# Patient Record
Sex: FEMALE | Race: WHITE
[De-identification: names, ages, dates, MRNs, and addresses within clinical notes are randomized per-mention and may not be internally consistent; named-entity substitution may affect disease eponyms.]

---

## 2018-06-07 NOTE — XMS REPORT
Clinical Summary

 Created on: 2018



Indy Pedraza

External Reference #: [id number]

: 1960

Sex: Female



Demographics







 Address  [address] [place]  [address]

 

 Home Phone  [phone]

 

 Preferred Language  English

 

 Marital Status  

 

 Oriental orthodox Affiliation  [id number]

 

 Race  Unknown

 

 Ethnic Group  Non-





Author







 Author  [facility]

 

 Organization  [facility]

 

 Address  Unknown

 

 Phone  Unavailable







Support







 Name  Relationship  Address  Phone

 

 John Pedraza  ECON  Unknown  [phone]







Care Team Providers







 Care Team Member Name  Role  Phone

 

 Lee Brito MD  PCP  [phone]







Allergies







    



  Active Allergy   Reactions   Severity   Noted Date   Comments

 

    



  Sumatriptan Succinate   Anaphylaxis   High   2016 

 

    



  Penicillins   Other (See Comments)    2016   PT HAD REACTION AS A



      CHILD

 

    



  Sulfa (Sulfonamide   Hives    2016 



  Antibiotics)    







Current Medications







      



  Prescription   Sig.   Disp.   Refills   Start   End Date   Status



      Date  

 

      



  diphenoxylate-atropine   4 (four) times a day as     20    Active



  (LOMOTIL) 2.5-0.025 mg   needed.     16  



  per tablet      

 

      



  estradiol (ESTRACE) 0.5   TAKE ONE (1) TABLET(S) BY    2   20    Active



  MG tablet   MOUTH ONCE A DAY.     15  

 

      



  hydromorPHONE (DILAUDID)   Take 4 mg by mouth 2     20    Active



  8 MG tablet   (two) times a day.     16  

 

      



  ibuprofen (ADVIL,MOTRIN)   TAKE ONE (1) TABLET(S) BY    1   20    Active



  800 MG tablet   MOUTH TWO TIMES A DAY     15  



   PRN.     

 

      



  promethazine (PHENERGAN)   TAKE ONE (1) TABLET(S) BY    1   20    Active



  25 MG tablet   MOUTH EVERY SIX HOURS AS     16  



   NEEDED.     

 

      



  ZYLET 0.3-0.5 %   INSTILL ONE (1) DROP IN    1   20    Active



  drops,suspension   BOTH EYES 3 TIMES A DAY.     16  

 

      



  tolterodine LA (DETROL   Take 4 mg by mouth daily     20    Active



  LA) 4 MG 24 hr capsule   as needed.     16  

 

      



  tretinoin (RETIN-A) 0.1 %   APPLY PEA SIZE AMOUNT TO    1   20    Active



  cream   ENTIRE FACE AT BEDTIME.     15  

 

      



  diazepam (VALIUM) 10 MG   Take 10 mg by mouth 2       Active



  tablet   (two) times a day as     



   needed for anxiety.     

 

      



  NP THYROID 30 mg   TAKE ONE (1) TABLET(S) BY   90 tablet   0   20    
Active



  tabletIndications:   MOUTH DAILY.     16  



  Hypothyroidism,      



  unspecified      



  hypothyroidism type      

 

      



  VITAMIN D2 50,000 unit   TAKE ONE (1) CAPSULE(S)   6 capsule   0   20  
  Active



  capsuleIndications:   BY MOUTH EVERY 14 DAYS.     16  



  Osteoporosis      

 

      



  NUCYNTA tablet   Take 1 tablet by mouth 4     20    Active



   (four) times a day.     16  

 

      



  gabapentin (NEURONTIN)   TAKE 1 TABLET BY MOUTH    0   20    Active



  800 mg tablet   EVERY EIGHT HOURS     17  

 

      



  metFORMIN (GLUMETZA) 500   TAKE ONE (1) TABLET(S) BY    3   20    Active



  mg 24 hr tablet   MOUTH TWICE A DAY.     17  

 

      



  alendronate (FOSAMAX) 70   1 tab po q week in the   12 tablet   3   20 
   Active



  MG tabletIndications:   morning w/full glass of     17  



  Age-related osteoporosis   water on an empty     



  without current   stomach, do not lie down     



  pathological fracture   for 30 min     

 

      



  FLUoxetine (PROzac) 20 MG   Take 1 capsule (20 mg   90 capsule   3   20
    Active



  capsule   total) by mouth daily.     17  

 

      



  metaxalone (SKELAXIN) 800   Take 1 tablet (800 mg   180 tablet   3   20
    Active



  MG tabletIndications:   total) by mouth 2 (two)     17  



  Lumbar radiculopathy   times a day as needed for     



   muscle spasms.     

 

      



  estradiol (ESTRACE) 0.01   Insert 1 gram vaginally   42.5 g   1   20    
Active



  % (0.1 mg/gram) vaginal   every night for 2 weeks,     18  



  creamIndications: Vaginal   then after insert 1 gram     



  atrophy   3 times per week at     



   night.     

 

      



  hydromorPHONE (DILAUDID)   TAKE ONE (1) TABLET(S) BY    0   20    Active



  4 MG tablet   MOUTH EVERY TWELVE HOURS     17  



   AS NEEDED FOR PAIN.     

 

      



  ESTRADIOL VAGL   INSERT 1 GRAM VAGINALLY    1   20    Active



   EVERY NIGHT FOR 2 WEEKS,     18  



   THEN AFTER INSERT 1 GRAM     



   3 TIMES PER WEEK AT     



   NIGHT.     

 

      



  DYMISTA 137-50 mcg/spray   USE ONE (1) SPRAY IN EACH    3   01/13/20    Active



  spray,non-aerosol   NOSTRIL DAILY AS NEEDED.     18  

 

      



  fluticasone (FLONASE) 50   INHALE TWO (2) SPRAYS    3   20    Active



  mcg/actuation nasal spray   INTO EACH NOSTRIL ONCE     18  



   DAILY.     

 

      



  FLUoxetine (PROzac) 10 MG   TAKE ONE (1) TO TWO (2)    0   20    Active



  capsule   CAPSULE(S) BY MOUTH EVERY     17  



   MORNING.     

 

      



  albuterol (PROAIR   Inhale 2 puffs every 6   18 g   0   20   
Active



  HFA,PROVENTIL   (six) hours as needed for     18   19 



  HFA,VENTOLIN HFA) 90   shortness of breath (or     



  mcg/actuation   cough).     



  inhalerIndications:      



  Bronchitis      

 

      



  clonIDINE (CATAPRES) 0.1   TAKE ONE (1) TABLET(S) BY   90 tablet   1       Active



  MG tablet   MOUTH THREE TIMES A DAY.     18  

 

      



  RESTASIS 0.05 %   INSTILL ONE (1) DROP IN    3   20   
Discontin



  ophthalmic emulsion   BOTH EYES TWICE A DAY.     16   18   ued

 

      



  FORTEO 20 mcg/dose - 600      20   Discontin



  mcg/2.4 mL injection      16   18   ued

 

      



  baclofen (LIORESAL) 10 MG      20   Discontin



  tablet      16   18   ued

 

      



  DULoxetine (CYMBALTA) 30      20   Discontin



  MG capsule      16   18   ued

 

      



  VITAMIN D2 50,000 unit   TAKE ONE (1) CAPSULE(S)   6 capsule   0   01/10/20   
01/19/20   Discontin



  capsuleIndications:   BY MOUTH EVERY 14 DAYS.     17   18   ued



  Osteoporosis      

 

      



  montelukast (SINGULAIR)   TAKE ONE (1) TABLET(S) BY   30 tablet   5   04/15/
20   01/19/20   Discontin



  10 mg tabletIndications:   MOUTH NIGHTLY.     17   18   ued



  Chronic sinusitis,      



  unspecified location      

 

      



  doxycycline (VIBRAMYCIN)      10/28/20   12/05/20   Discontin



  100 MG capsule      17   17   ued

 

      



  clonIDINE (CATAPRES) 0.1   TAKE ONE (1) TABLET(S) BY    3   20   Discontin



  MG tablet   MOUTH THREE TIMES A DAY.     17   18   ued

 

      



  alendronate (FOSAMAX) 70   TAKE ONE (1) TABLET(S) BY    12   20   Discontin



  MG tablet   MOUTH ONCE A WEEK.     17   17   ued

 

      



  FLUoxetine (PROzac) 20 MG   TAKE ONE (1) CAPSULE(S)    1   20   Discontin



  capsule   BY MOUTH EVERY DAY.     17   17   ued

 

      



  metaxalone (SKELAXIN) 800   TAKE ONE (1) TABLET(S) BY    3   10/10/20   12/05/
20   Discontin



  MG tablet   MOUTH THREE TIMES A DAY.     17   17   ued

 

      



  triamcinolone (KENALOG)   APPLY TO LESIONS 4 TIMES    3   10/27/20   01/19/20
   Discontin



  0.1 % paste   A DAY     17   18   ued

 

      



  doxycycline (VIBRAMYCIN)   Take 1 capsule (100 mg   90 capsule   3   20   



  100 MG   total) by mouth daily for     17   18 



  capsuleIndications: Other   121 days.     



  chronic osteomyelitis,      



  other site      

 

      



  diazePAM (VALIUM) 5 MG   Take 2 tablets (10 mg   30 tablet   0   20   



  tabletIndications:   total) by mouth every 6     18   18 



  Levator spasm   (six) hours as needed for     



   anxiety for up to 30     



   days.     

 

      



  azithromycin (ZITHROMAX)   Take 2 tablets the first   6 tablet   0   20   



  250 MG tabletIndications:   day, then 1 tablet daily     18   18 



  Bronchitis   for 4 days.     

 

      



  benzonatate (TESSALON   Take 2 capsules (200 mg   30 capsule   1   20   



  PERLES) 100 MG   total) by mouth 3 (three)     18   18 



  capsuleIndications:   times a day as needed for     



  Bronchitis   cough for up to 30 days.     

 

      



  methylPREDNISolone   follow package directions   21 tablet   0   20   



  (MEDROL, LONNY,) 4 mg      18   18 



  tabletIndications:      



  Bronchitis      

 

      



  albuterol (PROAIR   Inhale 2 puffs every 6   18 g   0   20   
Discontin



  HFA,PROVENTIL   (six) hours as needed for     18   18   ued



  HFA,VENTOLIN HFA) 90   shortness of breath (or     



  mcg/actuation   cough).     



  inhalerIndications:      



  Bronchitis      

 

      



  clonIDINE (CATAPRES) 0.1   TAKE ONE (1) TABLET(S) BY   90 tablet   2   20   Discontin



  MG tablet   MOUTH THREE TIMES A DAY.     18   18   ued

 

      



  cefdinir (OMNICEF) 300 MG   Take 1 capsule (300 mg   14 capsule   0   01/27/
20   02/03/20   



  capsule   total) by mouth 2 (two)     18   18 



   times a day for 7 days.     

 

      



  azithromycin (ZITHROMAX)   Take 2 tablets the first   6 tablet   0   20   



  250 MG tabletIndications:   day, then 1 tablet daily     18   18 



  Cat bite, initial   for 4 days.     



  encounter      

 

      



  nitrofurantoin,   Take 1 capsule (100 mg   14 capsule   0   20   



  macrocrystal-monohydrate,   total) by mouth 2 (two)     18   18 



  (MACROBID) 100 MG   times a day for 7 days.     



  capsuleIndications:      



  Dysuria, Petechiae      







Active Problems







 



  Problem   Noted Date

 

 



  Recurrent UTI   2018

 

 



  Mixed stress and urge urinary incontinence   2018

 

 



  Urinary frequency   2018

 

 



  Uterovaginal prolapse   2018

 

 



  Vaginal atrophy   2018

 

 



  Incomplete bladder emptying   2018

 

 



  Lumbar radiculopathy   2017

 

 



  Other chronic osteomyelitis, other site   2017

 

 



  Age-related osteoporosis without current pathological fracture   2017

 

 



  Screening for breast cancer   2017

 

 



  Hypothyroidism   2017

 

 



  Chronic back pain   2016

 

 



  Essential hypertension   2016

 

 



  HLD (hyperlipidemia)   2016

 

 



  Hypertension   2016

 

 



  Primary fibromyalgia syndrome   2016

 

 



  Temporary cerebral vascular dysfunction   2016







Encounters







    



  Date   Type   Specialty   Care Team   Description

 

    



  2018   Refill   Internal Medicine   Chris Brito MD 

 

    



  03/15/2018   Refill   Family Medicine   Chris Brito MD   Cat bite, 
initial



      encounter

 

    



  03/15/2018   Refill   Family Medicine   Meghana Ortega MD   Cat bite, 
initial



      encounter

 

    



  2018   Office Visit   Internal Medicine   Chris Brito MD   
Dysuria (Primary Dx);



      Vitamin D deficiency;



      Petechiae;



      Shortness of breath;



      Bronchitis;



      Night terror

 

    



  2018   Office Visit   Family Medicine   Meghana Ortega MD   Cat 
bite, initial



      encounter (Primary Dx)

 

    



  2018   Telephone   Internal Medicine   Awilda Dunham MA 

 

    



  2018   Refill   Internal Medicine   Chris Brito MD   
Bronchitis

 

    



  2018   Orders Only   Internal Medicine   Chris Brito MD 

 

    



  2018   Telephone   Internal Medicine   Chris Brito MD 

 

    



  2018   Refill   Internal Medicine   Chris Brito MD 

 

    



  2018   Orders Only   Internal Medicine   Awilda Dunham MA   
Screening for breast



      cancer

 

    



  2018   Office Visit   Internal Medicine   Chris Brito MD   Flu
-like symptoms



      (Primary Dx);



      Bronchitis

 

    



  2018   Telephone   Obstetrics and Gynecology   Fara Noriega MD 

 

    



  2018   Refill   Gynecologic Oncology   Fara Noriega   
Levator spasm



     MD 

 

    



  2018   Telephone   Gynecologic Oncology   Fara Noriega MD 

 

    



  2018   Office Visit   Urogynecology   Fara Noriega   Mixed 
stress and urge



     MD   urinary incontinence



      (Primary Dx);



      Recurrent UTI;



      Vaginal atrophy;



      Incomplete bladder



      emptying;



      Constipation, unspecified



      constipation type;



      Levator spasm

 

    



  2018   Telephone   Internal Medicine   Awilda Dunham MA 

 

    



  2017   Telephone   Internal Medicine   Chris Brito MD 

 

    



  2017   Office Visit   Internal Medicine   Chris Brito MD   
Primary fibromyalgia



      syndrome (Primary Dx);



      Essential hypertension;



      Mixed hyperlipidemia;



      Screening for breast



      cancer;



      Age-related osteoporosis



      without current



      pathological fracture;



      Other chronic



      osteomyelitis, other



      site;



      Lumbar radiculopathy;



      Acquired hypothyroidism

 

    



  2017   Telephone   Physical Therapy   Melecio Arteaga MD 

 

    



  10/25/2017   Telephone   Physical Therapy   Melecio Arteaga MD 

 

    



  10/24/2017   Transcribe   Physical Therapy   Melecio Arteaga MD   Other 
spondylosis with



   Orders     radiculopathy,



      lumbosacral region



      (Primary Dx)

 

    



  2017   Telephone   Physical Therapy   Chanel Crisostomo PTA 

 

    



  2017   Feleciaribe   Physical Therapy   Melecio Arteaga MD   
Lumbosacral radiculopathy



   Orders     due to degenerative joint



      disease of spine (Primary



      Dx)

 

    



  2017   Telephone   Physical Melecio Cunha MD 

 

    



  2017   Telephone   Physical Melecio Cunha MD 

 

    



  2017   Transcribe   Physical Melecio Cunha MD   
Lumbosacral radiculopathy



   Orders     due to degenerative joint



      disease of spine (Primary



      Dx)



after 2017



Immunizations







  



  Name   Dates Previously Given   Next Due

 

  



  INFLUENZA QUAD   2016 

 

  



  Influenza Trivalent   10/01/2015 

 

  



  Zoster   10/01/2015 







Family History







   



  Medical History   Relation   Name   Comments

 

   



  Osteoporosis   Brother  

 

   



  Cancer   Father  

 

   



  Hypertension   Mother  

 

   



  Diabetes   Paternal  



   Grandmother  









   



  Relation   Name   Status   Comments

 

   



  Brother    Alive 

 

   



  Father       lung cancer

 

   



  Mother     

 

   



  Paternal Grandmother     







Social History







    



  Tobacco Use   Types   Packs/Day   Years Used   Date

 

    



  Never Smoker    

 

    



  Smokeless Tobacco: Never   



  Used   









 Tobacco Cessation: Counseling Given: Yes











   



  Alcohol Use   Drinks/Week   oz/Week   Comments

 

   



  Yes     OCCASIONAL









 



  Sex Assigned at Birth   Date Recorded

 

 



  Not on file 







Last Filed Vital Signs







  



  Vital Sign   Reading   Time Taken

 

  



  Blood Pressure   120/81   2018  8:57 AM CST

 

  



  Pulse   84   2018  8:57 AM CST

 

  



  Temperature   36.6   C (97.9   F)   2018  4:06 PM CST

 

  



  Respiratory Rate   20   2018  8:57 AM CST

 

  



  Oxygen Saturation   96%   2018  8:57 AM CST

 

  



  Inhaled Oxygen   -   -



  Concentration  

 

  



  Weight   66.2 kg (146 lb)   2018  8:57 AM CST

 

  



  Height   156.2 cm (5' 1.5")   2018  8:57 AM CST

 

  



  Body Mass Index   27.14   2018  8:57 AM CST







Plan of Treatment







   



  Health Maintenance   Due Date   Last Done   Comments

 

   



  CERVICAL CANCER SCREENING   1979  

 

   



  COLON CANCER SCREENING   2008  

 

   



  SHINGRIX VACCINE (#1)   2008  

 

   



  INFLUENZA VACCINE   2018, 10/01/2015 

 

   



  BREAST CANCER SCREENING   2020, 2018 







Results

* CBC with platelet and differential (2018 10:38 AM)





  



  Component   Value   Ref Range

 

  



  WBC   4.2   3.8 - 10.8 Thousand/uL

 

  



  RBC   4.05   3.80 - 5.10 Million/uL

 

  



  HGB   12.1   11.7 - 15.5 g/dL

 

  



  HCT   35.7   35.0 - 45.0 %

 

  



  MCV   88.1   80.0 - 100.0 fL

 

  



  MCH   29.9   27.0 - 33.0 pg

 

  



  MCHC   33.9   32.0 - 36.0 g/dL

 

  



  RDW   12.8   11.0 - 15.0 %

 

  



  Platelet count   196   140 - 400 Thousand/uL

 

  



  MPV   11.0   7.5 - 12.5 fL

 

  



  Neutrophils, absolute   1,936   1,500 - 7,800 cells/uL

 

  



  Lymphocytes, absolute   1,709   850 - 3,900 cells/uL

 

  



  Monocytes, absolute   374   200 - 950 cells/uL

 

  



  Eosinophils, absolute   130   15 - 500 cells/uL

 

  



  Basophils, absolute   50   0 - 200 cells/uL

 

  



  Neutrophils   46.1   %

 

  



  Lymphocytes   40.7   %

 

  



  Monocytes   8.9   %

 

  



  Eosinophils   3.1   %

 

  



  Basophils + RC   1.2   %









 



  Specimen   Performing Laboratory

 

 



  Blood   [facility]









 Narrative

 

 



FASTING:YES



FASTING: YES





* Comprehensive metabolic panel (2018 10:38 AM)





  



  Component   Value   Ref Range

 

  



  Glucose   88   65 - 99 mg/dL



   Comment: 



   Fasting reference interval 

 

  



  BUN, whole blood   17   7 - 25 mg/dL

 

  



  Creatinine   0.66   0.50 - 1.05 mg/dL



   Comment: 



   For patients >49 years of age, the reference limit 



   for Creatinine is approximately 13% higher for 



   people 



   identified as -American. 

 

  



  EGFR Non-Afr. American   97   > OR=60 mL/min/1.73m2

 

  



  EGFR    112   > OR=60 mL/min/1.73m2

 

  



  BUN/creatinine ratio   NOT APPLICABLE   6 - 22 (calc)

 

  



  Sodium   136   135 - 146 mmol/L

 

  



  Potassium   4.2   3.5 - 5.3 mmol/L

 

  



  Chloride   102   98 - 110 mmol/L

 

  



  CO2   27   20 - 31 mmol/L

 

  



  Calcium   9.4   8.6 - 10.4 mg/dL

 

  



  Protein   7.1   6.1 - 8.1 g/dL

 

  



  Albumin, S   4.5   3.6 - 5.1 g/dL

 

  



  Globulin, total   2.6   1.9 - 3.7 g/dL (calc)

 

  



  Albumin/globulin ratio   1.7   1.0 - 2.5 (calc)

 

  



  Total bilirubin   0.4   0.2 - 1.2 mg/dL

 

  



  Alkaline phosphatase   58   33 - 130 U/L

 

  



  AST   32   10 - 35 U/L

 

  



  ALT   31 (H)   6 - 29 U/L









 



  Specimen   Performing Laboratory

 

 



  Blood   [facility]









 Narrative

 

 



FASTING:YES



FASTING: YES





* US Breast Complete Bilateral (2018  4:33 PM)





 



  Specimen   Performing Laboratory

 

 



   [address] [address]





* Mammo Breast Screen Tomosynthesis Bilateral (2018  4:32 PM)





 



  Specimen   Performing Laboratory

 

 



   [address] [address]





* POC Influenza A/B (2018 11:16 AM)





  



  Component   Value   Ref Range

 

  



  Rapid Influenza A Ag   negative 

 

  



  Rapid Influenza B Ag   negative 









 



  Specimen   Performing Laboratory

 

 



  Nasopharyngeal 





* POC urinalysis dipstick (2018 12:26 PM)





  



  Component   Value   Ref Range

 

  



  Color urine, POC   Yellow 

 

  



  Clarity urine, POC   Clear 

 

  



  Glucose urine, POC   Negative   Negative

 

  



  Bilirubin urine, POC   Positive (A)   Negative

 

  



  Ketones urine, POC   Negative   Negative

 

  



  Specific gravity urine,   </=1.005   1.005 - 1.030



  POC  

 

  



  Blood urine, POC   Negative   Negative

 

  



  pH urine, POC   6.5   5.0, 5.5, 6.0, 6.5, 7.0,



    7.5, 8.0, 8.5

 

  



  Protein urine, POC   Negative   Negative

 

  



  Urobilinogen urine, POC   <2.0   <2.0

 

  



  Nitrite urine, POC   Negative   Negative

 

  



  Leukocyte esterase urine,   Negative   Negative



  POC  









 



  Specimen   Performing Laboratory

 

 



  Urine 





after 2017



Insurance







     



  Payer   Benefit   Subscriber ID   Type   Phone   Address



   Plan /    



   Group    

 

     



  [employer]ARE   [employer]   xxxxxxxxx   PPO  



   MEDICARE    



   PPO/PFFS/E    



   [facility]    









     



  Guarantor Name   Account   Relation to   Date of   Phone   Billing Address



   Type   Patient   Birth  

 

     



  INDY PEDRAZA   Personal/F   Self   1960   Home:   [address] [place] [address] [address]     [phone]   [place] [address]

## 2022-11-14 ENCOUNTER — HOSPITAL ENCOUNTER (OUTPATIENT)
Dept: HOSPITAL 88 - DX | Age: 62
End: 2022-11-14
Payer: MEDICARE

## 2022-11-14 DIAGNOSIS — L03.115: Primary | ICD-10-CM

## 2022-11-14 LAB
DEPRECATED APTT PLAS QN: 27.9 SECONDS (ref 23.8–35.5)
DEPRECATED INR PLAS: 0.97
PROTHROMBIN TIME: 13.8 SECONDS (ref 11.9–14.5)

## 2022-11-14 PROCEDURE — 85730 THROMBOPLASTIN TIME PARTIAL: CPT

## 2022-11-14 PROCEDURE — 36415 COLL VENOUS BLD VENIPUNCTURE: CPT

## 2022-11-14 PROCEDURE — 36569 INSJ PICC 5 YR+ W/O IMAGING: CPT

## 2022-11-14 PROCEDURE — 71045 X-RAY EXAM CHEST 1 VIEW: CPT

## 2022-11-14 PROCEDURE — 85610 PROTHROMBIN TIME: CPT

## 2022-11-15 ENCOUNTER — HOSPITAL ENCOUNTER (EMERGENCY)
Dept: HOSPITAL 88 - ER | Age: 62
Discharge: HOME | End: 2022-11-15
Payer: MEDICARE

## 2022-11-15 VITALS — BODY MASS INDEX: 28.47 KG/M2 | WEIGHT: 145 LBS | HEIGHT: 60 IN

## 2022-11-15 VITALS — SYSTOLIC BLOOD PRESSURE: 143 MMHG | DIASTOLIC BLOOD PRESSURE: 74 MMHG

## 2022-11-15 DIAGNOSIS — I10: ICD-10-CM

## 2022-11-15 DIAGNOSIS — E78.5: ICD-10-CM

## 2022-11-15 DIAGNOSIS — Z88.0: ICD-10-CM

## 2022-11-15 DIAGNOSIS — Z88.2: ICD-10-CM

## 2022-11-15 DIAGNOSIS — T82.838A: Primary | ICD-10-CM

## 2022-11-15 DIAGNOSIS — Z88.8: ICD-10-CM

## 2022-11-15 DIAGNOSIS — E11.9: ICD-10-CM

## 2022-11-15 PROCEDURE — 71045 X-RAY EXAM CHEST 1 VIEW: CPT

## 2022-11-15 PROCEDURE — 99283 EMERGENCY DEPT VISIT LOW MDM: CPT

## 2022-11-18 ENCOUNTER — HOSPITAL ENCOUNTER (EMERGENCY)
Dept: HOSPITAL 88 - ER | Age: 62
Discharge: HOME | End: 2022-11-18
Payer: MEDICARE

## 2022-11-18 VITALS — WEIGHT: 145 LBS | BODY MASS INDEX: 28.47 KG/M2 | HEIGHT: 60 IN

## 2022-11-18 DIAGNOSIS — I10: ICD-10-CM

## 2022-11-18 DIAGNOSIS — F41.9: ICD-10-CM

## 2022-11-18 DIAGNOSIS — F32.A: ICD-10-CM

## 2022-11-18 DIAGNOSIS — E78.5: ICD-10-CM

## 2022-11-18 DIAGNOSIS — E11.9: ICD-10-CM

## 2022-11-18 DIAGNOSIS — R07.89: Primary | ICD-10-CM

## 2022-11-18 LAB
ALBUMIN SERPL-MCNC: 4.1 G/DL (ref 3.5–5)
ALBUMIN/GLOB SERPL: 1.2 {RATIO} (ref 0.8–2)
ALP SERPL-CCNC: 69 IU/L (ref 40–150)
ALT SERPL-CCNC: 65 IU/L (ref 0–55)
ANION GAP SERPL CALC-SCNC: 14.8 MMOL/L (ref 8–16)
BASOPHILS # BLD AUTO: 0.1 10*3/UL (ref 0–0.1)
BASOPHILS NFR BLD AUTO: 1 % (ref 0–1)
BUN SERPL-MCNC: 8 MG/DL (ref 7–26)
BUN/CREAT SERPL: 12 (ref 6–25)
CALCIUM SERPL-MCNC: 9.2 MG/DL (ref 8.4–10.2)
CHLORIDE SERPL-SCNC: 104 MMOL/L (ref 98–107)
CO2 SERPL-SCNC: 26 MMOL/L (ref 22–29)
DEPRECATED NEUTROPHILS # BLD AUTO: 3.2 10*3/UL (ref 2.1–6.9)
EOSINOPHIL # BLD AUTO: 0.1 10*3/UL (ref 0–0.4)
EOSINOPHIL NFR BLD AUTO: 1.7 % (ref 0–6)
ERYTHROCYTE [DISTWIDTH] IN CORD BLOOD: 12.2 % (ref 11.7–14.4)
GLOBULIN PLAS-MCNC: 3.4 G/DL (ref 2.3–3.5)
GLUCOSE SERPLBLD-MCNC: 104 MG/DL (ref 74–118)
HCT VFR BLD AUTO: 39.4 % (ref 34.2–44.1)
HGB BLD-MCNC: 12.6 G/DL (ref 12–16)
LYMPHOCYTES # BLD: 1.9 10*3/UL (ref 1–3.2)
LYMPHOCYTES NFR BLD AUTO: 32.6 % (ref 18–39.1)
MCH RBC QN AUTO: 30.2 PG (ref 28–32)
MCHC RBC AUTO-ENTMCNC: 32 G/DL (ref 31–35)
MCV RBC AUTO: 94.5 FL (ref 81–99)
MONOCYTES # BLD AUTO: 0.6 10*3/UL (ref 0.2–0.8)
MONOCYTES NFR BLD AUTO: 10.3 % (ref 4.4–11.3)
NEUTS SEG NFR BLD AUTO: 54.1 % (ref 38.7–80)
PLATELET # BLD AUTO: 159 X10E3/UL (ref 140–360)
POTASSIUM SERPL-SCNC: 3.8 MMOL/L (ref 3.5–5.1)
RBC # BLD AUTO: 4.17 X10E6/UL (ref 3.6–5.1)
SODIUM SERPL-SCNC: 141 MMOL/L (ref 136–145)

## 2022-11-18 PROCEDURE — 80053 COMPREHEN METABOLIC PANEL: CPT

## 2022-11-18 PROCEDURE — 84484 ASSAY OF TROPONIN QUANT: CPT

## 2022-11-18 PROCEDURE — 36415 COLL VENOUS BLD VENIPUNCTURE: CPT

## 2022-11-18 PROCEDURE — 85025 COMPLETE CBC W/AUTO DIFF WBC: CPT

## 2022-11-18 PROCEDURE — 71045 X-RAY EXAM CHEST 1 VIEW: CPT

## 2022-11-18 PROCEDURE — 99283 EMERGENCY DEPT VISIT LOW MDM: CPT

## 2022-11-18 PROCEDURE — 83880 ASSAY OF NATRIURETIC PEPTIDE: CPT
